# Patient Record
Sex: MALE | URBAN - METROPOLITAN AREA
[De-identification: names, ages, dates, MRNs, and addresses within clinical notes are randomized per-mention and may not be internally consistent; named-entity substitution may affect disease eponyms.]

---

## 2018-06-14 ENCOUNTER — EMERGENCY (EMERGENCY)
Facility: HOSPITAL | Age: 33
LOS: 1 days | Discharge: HOME | End: 2018-06-14
Attending: EMERGENCY MEDICINE | Admitting: EMERGENCY MEDICINE
Payer: COMMERCIAL

## 2018-06-14 VITALS
DIASTOLIC BLOOD PRESSURE: 71 MMHG | TEMPERATURE: 98 F | HEART RATE: 79 BPM | OXYGEN SATURATION: 100 % | RESPIRATION RATE: 20 BRPM | SYSTOLIC BLOOD PRESSURE: 108 MMHG

## 2018-06-14 PROCEDURE — 12001 RPR S/N/AX/GEN/TRNK 2.5CM/<: CPT | Mod: F2

## 2018-06-14 PROCEDURE — 99284 EMERGENCY DEPT VISIT MOD MDM: CPT | Mod: 25

## 2018-06-14 PROCEDURE — 73140 X-RAY EXAM OF FINGER(S): CPT | Mod: 26,LT

## 2018-06-14 RX ORDER — TETANUS TOXOID, REDUCED DIPHTHERIA TOXOID AND ACELLULAR PERTUSSIS VACCINE, ADSORBED 5; 2.5; 8; 8; 2.5 [IU]/.5ML; [IU]/.5ML; UG/.5ML; UG/.5ML; UG/.5ML
0.5 SUSPENSION INTRAMUSCULAR ONCE
Qty: 0 | Refills: 0 | Status: COMPLETED | OUTPATIENT
Start: 2018-06-14 | End: 2018-06-14

## 2018-06-14 RX ADMIN — TETANUS TOXOID, REDUCED DIPHTHERIA TOXOID AND ACELLULAR PERTUSSIS VACCINE, ADSORBED 0.5 MILLILITER(S): 5; 2.5; 8; 8; 2.5 SUSPENSION INTRAMUSCULAR at 13:22

## 2018-06-14 NOTE — ED PROVIDER NOTE - ATTENDING CONTRIBUTION TO CARE
Pt was seen and evaluated by me. Pt is a 33 y/o male with injury to left 3rd finger. Pt states he was grinding metal and cut his left 3rd finger. Pt notes having pain to left 3rd finger. Pt denies any numbness. Denies any fever, chills, nausea, vomiting, SOB, chest pain, or abd pain. Noted to have a 1.5cm lac to posterior left 3rd finger. FROM. Able to flex and extend without an difficulty. No loss of sensation. + distal pulses.

## 2018-06-14 NOTE — ED PROVIDER NOTE - PROGRESS NOTE DETAILS
Spoke with Dr. Restrepo regarding tendon injury - requesting xray. will xray finger and speak with Dr. Restrepo xray shows no fracture - spoke with Dr. Restrepo - requesting laceration be sutured and he will patient in office tomorrow for possible tendon repair.

## 2018-06-14 NOTE — ED ADULT TRIAGE NOTE - CHIEF COMPLAINT QUOTE
Pt states was working with a  and sliced his left hand middle finger. pt states you can see bone arrives with bandage over site.

## 2018-06-14 NOTE — ED PROVIDER NOTE - SKIN WOUND TYPE
+1.5 cm laceration to dorsum of left 3rd digit slightly on the radial side. sensations intact. full rom, good cap refill +1.5 cm laceration to dorsum of left 3rd digit slightly on the radial side. sensations intact. full rom, good cap refill  rfull rom but c/o weakness on extension.  after irrigation - + partial extensor tendon tear noted.

## 2018-06-14 NOTE — ED PROVIDER NOTE - OBJECTIVE STATEMENT
33 y/o male no PMH presents to ED c/o laceration to left hand. Pt. states an hour ago was using  and it slipped causing laceration to dorsum of left middle finger radial aspect just distal to mcp joint. States increased bleeding and pain - wrapped area and comes to ED. C/o pain to area when he moves finger. Denies numbness tingling fever chills nasuea vomit.  cannot recall last tetanus vaccine. 31 y/o male no PMH presents to ED c/o laceration to left hand. Pt. states an hour ago was using  and it slipped causing laceration to dorsum of left middle finger radial aspect just distal to mcp joint. States increased bleeding and pain - wrapped area and comes to ED. C/o pain to area when he moves finger. Denies numbness tingling fever chills nausea vomit.  cannot recall last tetanus vaccine.